# Patient Record
Sex: FEMALE | Race: WHITE | Employment: FULL TIME | URBAN - NONMETROPOLITAN AREA
[De-identification: names, ages, dates, MRNs, and addresses within clinical notes are randomized per-mention and may not be internally consistent; named-entity substitution may affect disease eponyms.]

---

## 2024-09-18 DIAGNOSIS — F90.2 ATTENTION DEFICIT HYPERACTIVITY DISORDER (ADHD), COMBINED TYPE: ICD-10-CM

## 2024-09-18 RX ORDER — DEXTROAMPHETAMINE SACCHARATE, AMPHETAMINE ASPARTATE, DEXTROAMPHETAMINE SULFATE AND AMPHETAMINE SULFATE 5; 5; 5; 5 MG/1; MG/1; MG/1; MG/1
20 TABLET ORAL 2 TIMES DAILY
Qty: 60 TABLET | Refills: 0 | Status: SHIPPED | OUTPATIENT
Start: 2024-09-18 | End: 2024-10-18 | Stop reason: SDUPTHER

## 2024-09-18 NOTE — TELEPHONE ENCOUNTER
Prescription sent to the pharmacy as below.  Please notify the patient.    Requested Prescriptions     Signed Prescriptions Disp Refills    amphetamine-dextroamphetamine (ADDERALL, 20MG,) 20 MG tablet 60 tablet 0     Sig: Take 1 tablet by mouth 2 times daily for 30 days. Max Daily Amount: 40 mg     Authorizing Provider: GEMA GOODWIN report reviewed and no contraindications or problems noted.          Electronically signed by Gema Goodwin MD on 9/18/2024 at 11:04 AM

## 2024-09-18 NOTE — TELEPHONE ENCOUNTER
This medication refill is regarding a MyChart request. Refill requested by patient.    Requested Prescriptions     Pending Prescriptions Disp Refills    amphetamine-dextroamphetamine (ADDERALL, 20MG,) 20 MG tablet 60 tablet 0     Sig: Take 1 tablet by mouth 2 times daily for 30 days. Max Daily Amount: 40 mg     Date of last visit: 8/16/2024   Date of next visit: 2/21/2025  Date of last refill: 8/16/24 #60/0  Pharmacy Name: [facility] [address]    Rx verified, ordered and set to EP.

## 2024-09-20 ENCOUNTER — HOSPITAL ENCOUNTER (OUTPATIENT)
Dept: WOMENS IMAGING | Age: 54
Discharge: HOME | End: 2024-09-20
Payer: COMMERCIAL

## 2024-09-20 DIAGNOSIS — Z12.31 ENCOUNTER FOR SCREENING MAMMOGRAM FOR MALIGNANT NEOPLASM OF BREAST: ICD-10-CM

## 2024-09-20 PROCEDURE — 77063 BREAST TOMOSYNTHESIS BI: CPT

## 2024-10-15 DIAGNOSIS — J45.30 ALLERGY-INDUCED ASTHMA, MILD PERSISTENT, UNCOMPLICATED: ICD-10-CM

## 2024-10-15 RX ORDER — FLUTICASONE PROPIONATE AND SALMETEROL 250; 50 UG/1; UG/1
POWDER RESPIRATORY (INHALATION)
Qty: 180 EACH | Refills: 3 | Status: SHIPPED | OUTPATIENT
Start: 2024-10-15

## 2024-10-15 NOTE — TELEPHONE ENCOUNTER
Request sent from [facility] for refill of Wixela inhaler bid.  Last seen 8/16/24, next appt 2/21/25.  Order pended.

## 2024-10-17 DIAGNOSIS — A60.00 GENITAL HERPES SIMPLEX, UNSPECIFIED SITE: ICD-10-CM

## 2024-10-17 RX ORDER — ACYCLOVIR 400 MG/1
400 TABLET ORAL 2 TIMES DAILY
Qty: 180 TABLET | Refills: 3 | Status: SHIPPED | OUTPATIENT
Start: 2024-10-17

## 2024-10-17 NOTE — TELEPHONE ENCOUNTER
This medication refill is regarding a electronic request. Refill requested by  Pharmacy .    Requested Prescriptions     Pending Prescriptions Disp Refills    acyclovir (ZOVIRAX) 400 MG tablet [Pharmacy Med Name: ACYCLOVIR 400 MG TABLET] 180 tablet 3     Sig: TAKE 1 TAB 3 TIMES A DAY FOR 10 DAYS THEN DECREASE TO 1 TAB TWICE A DAY THEREAFTER.     Date of last visit: 8/16/2024   Date of next visit: 2/21/2025  Date of last refill: 12/26/2023 #60/11  [facility] Name: [facility] [address]    Last Lipid Panel:    Lab Results   Component Value Date/Time    CHOL 218 02/22/2022 08:48 AM    TRIG 67 02/22/2022 08:48 AM    HDL 63 02/22/2022 08:48 AM     Last CMP:   Lab Results   Component Value Date     12/10/2023    K 3.9 12/10/2023     12/10/2023    CO2 22 (L) 12/10/2023    BUN 23 (H) 12/10/2023    CREATININE 0.9 12/10/2023    GLUCOSE 87 12/10/2023    CALCIUM 8.9 12/10/2023    BILITOT 0.3 12/10/2023    ALKPHOS 56 12/10/2023    AST 19 12/10/2023    ALT 25 12/10/2023    LABGLOM >60 12/10/2023       Last Thyroid:    Lab Results   Component Value Date    TSH 2.930 06/19/2019     Last Hemoglobin A1C:  No results found for: \"LABA1C\"    Rx verified, ordered and set to EP.

## 2024-10-18 DIAGNOSIS — F90.2 ATTENTION DEFICIT HYPERACTIVITY DISORDER (ADHD), COMBINED TYPE: ICD-10-CM

## 2024-10-18 RX ORDER — DEXTROAMPHETAMINE SACCHARATE, AMPHETAMINE ASPARTATE, DEXTROAMPHETAMINE SULFATE AND AMPHETAMINE SULFATE 5; 5; 5; 5 MG/1; MG/1; MG/1; MG/1
20 TABLET ORAL 2 TIMES DAILY
Qty: 60 TABLET | Refills: 0 | Status: SHIPPED | OUTPATIENT
Start: 2024-10-18 | End: 2024-11-17

## 2024-10-18 NOTE — TELEPHONE ENCOUNTER
Prescription sent to the pharmacy as below.  Please notify the patient.    Requested Prescriptions     Signed Prescriptions Disp Refills    amphetamine-dextroamphetamine (ADDERALL, 20MG,) 20 MG tablet 60 tablet 0     Sig: Take 1 tablet by mouth 2 times daily for 30 days. Max Daily Amount: 40 mg     Authorizing Provider: GEMA GOODWIN report reviewed and no contraindications or problems noted.          Electronically signed by Gema Goodwin MD on 10/18/2024 at 10:21 AM

## 2024-10-18 NOTE — TELEPHONE ENCOUNTER
This medication refill is regarding a electronic request. Refill requested by  patient .    Requested Prescriptions     Pending Prescriptions Disp Refills    amphetamine-dextroamphetamine (ADDERALL, 20MG,) 20 MG tablet 60 tablet 0     Sig: Take 1 tablet by mouth 2 times daily for 30 days. Max Daily Amount: 40 mg     Date of last visit: 8/16/2024   Date of next visit: 2/21/2025  Date of last refill: 09/18/2024 #60/0  [facility]: [facility] [address]    Last Lipid Panel:    Lab Results   Component Value Date/Time    CHOL 218 02/22/2022 08:48 AM    TRIG 67 02/22/2022 08:48 AM    HDL 63 02/22/2022 08:48 AM     Last CMP:   Lab Results   Component Value Date     12/10/2023    K 3.9 12/10/2023     12/10/2023    CO2 22 (L) 12/10/2023    BUN 23 (H) 12/10/2023    CREATININE 0.9 12/10/2023    GLUCOSE 87 12/10/2023    CALCIUM 8.9 12/10/2023    BILITOT 0.3 12/10/2023    ALKPHOS 56 12/10/2023    AST 19 12/10/2023    ALT 25 12/10/2023    LABGLOM >60 12/10/2023       Last Thyroid:    Lab Results   Component Value Date    TSH 2.930 06/19/2019     Last Hemoglobin A1C:  No results found for: \"LABA1C\"    Rx verified, ordered and set to EP.

## 2024-11-15 DIAGNOSIS — F90.2 ATTENTION DEFICIT HYPERACTIVITY DISORDER (ADHD), COMBINED TYPE: ICD-10-CM

## 2024-11-18 DIAGNOSIS — F90.2 ATTENTION DEFICIT HYPERACTIVITY DISORDER (ADHD), COMBINED TYPE: ICD-10-CM

## 2024-11-18 RX ORDER — DEXTROAMPHETAMINE SACCHARATE, AMPHETAMINE ASPARTATE, DEXTROAMPHETAMINE SULFATE AND AMPHETAMINE SULFATE 5; 5; 5; 5 MG/1; MG/1; MG/1; MG/1
20 TABLET ORAL 2 TIMES DAILY
Qty: 60 TABLET | Refills: 0 | Status: SHIPPED | OUTPATIENT
Start: 2024-11-18 | End: 2024-12-18

## 2024-11-18 RX ORDER — DEXTROAMPHETAMINE SACCHARATE, AMPHETAMINE ASPARTATE, DEXTROAMPHETAMINE SULFATE AND AMPHETAMINE SULFATE 5; 5; 5; 5 MG/1; MG/1; MG/1; MG/1
20 TABLET ORAL 2 TIMES DAILY
Qty: 60 TABLET | Refills: 0 | Status: CANCELLED | OUTPATIENT
Start: 2024-11-18 | End: 2024-12-18

## 2024-11-18 NOTE — TELEPHONE ENCOUNTER
Prescription sent to the pharmacy as below.  Please notify the patient.    Requested Prescriptions     Signed Prescriptions Disp Refills    amphetamine-dextroamphetamine (ADDERALL, 20MG,) 20 MG tablet 60 tablet 0     Sig: Take 1 tablet by mouth 2 times daily for 30 days. Max Daily Amount: 40 mg     Authorizing Provider: GEMA GOODWIN report reviewed and no contraindications or problems noted.          Electronically signed by Gema Goodwin MD on 11/18/2024 at 11:13 AM

## 2024-11-18 NOTE — TELEPHONE ENCOUNTER
This medication refill is regarding a MyChart request. Refill requested by patient.    Requested Prescriptions     Pending Prescriptions Disp Refills    amphetamine-dextroamphetamine (ADDERALL, 20MG,) 20 MG tablet 60 tablet 0     Sig: Take 1 tablet by mouth 2 times daily for 30 days. Max Daily Amount: 40 mg     Date of last visit: 8/16/2024   Date of next visit: 2/21/2025  Date of last refill: 10/18/24 #60/0  [facility]: [facility] [address]    Rx verified, ordered and set to EP.

## 2024-11-18 NOTE — TELEPHONE ENCOUNTER
This medication refill is regarding a MyChart request. Refill requested by  patient .    Requested Prescriptions     Pending Prescriptions Disp Refills    amphetamine-dextroamphetamine (ADDERALL, 20MG,) 20 MG tablet 60 tablet 0     Sig: Take 1 tablet by mouth 2 times daily for 30 days. Max Daily Amount: 40 mg       Date of last visit: 8/16/2024   Date of next visit: 2/21/2025  Date of last refill: 11/18/2024  60/0  Last Lipid Panel:    Lab Results   Component Value Date/Time    CHOL 218 02/22/2022 08:48 AM    TRIG 67 02/22/2022 08:48 AM    HDL 63 02/22/2022 08:48 AM     Last CMP:   Lab Results   Component Value Date     12/10/2023    K 3.9 12/10/2023     12/10/2023    CO2 22 (L) 12/10/2023    BUN 23 (H) 12/10/2023    CREATININE 0.9 12/10/2023    GLUCOSE 87 12/10/2023    CALCIUM 8.9 12/10/2023    BILITOT 0.3 12/10/2023    ALKPHOS 56 12/10/2023    AST 19 12/10/2023    ALT 25 12/10/2023    LABGLOM >60 12/10/2023       Last Thyroid:    Lab Results   Component Value Date    TSH 2.930 06/19/2019     Denied. Script already sent to pharmacy today.

## 2024-11-25 ENCOUNTER — LAB (OUTPATIENT)
Dept: LAB | Age: 54
End: 2024-11-25

## 2024-11-25 DIAGNOSIS — Z00.00 ANNUAL PHYSICAL EXAM: ICD-10-CM

## 2024-11-25 LAB
ALBUMIN SERPL BCG-MCNC: 4.2 G/DL (ref 3.5–5.1)
ALP SERPL-CCNC: 68 U/L (ref 38–126)
ALT SERPL W/O P-5'-P-CCNC: 16 U/L (ref 11–66)
ANION GAP SERPL CALC-SCNC: 11 MEQ/L (ref 8–16)
AST SERPL-CCNC: 19 U/L (ref 5–40)
BILIRUB SERPL-MCNC: 0.4 MG/DL (ref 0.3–1.2)
BUN SERPL-MCNC: 16 MG/DL (ref 7–22)
CALCIUM SERPL-MCNC: 9.4 MG/DL (ref 8.5–10.5)
CHLORIDE SERPL-SCNC: 106 MEQ/L (ref 98–111)
CHOLEST SERPL-MCNC: 176 MG/DL (ref 100–199)
CO2 SERPL-SCNC: 24 MEQ/L (ref 23–33)
CREAT SERPL-MCNC: 0.9 MG/DL (ref 0.4–1.2)
GFR SERPL CREATININE-BSD FRML MDRD: 76 ML/MIN/1.73M2
GLUCOSE SERPL-MCNC: 106 MG/DL (ref 70–108)
HDLC SERPL-MCNC: 62 MG/DL
LDLC SERPL CALC-MCNC: 104 MG/DL
POTASSIUM SERPL-SCNC: 4.2 MEQ/L (ref 3.5–5.2)
PROT SERPL-MCNC: 6.4 G/DL (ref 6.1–8)
SODIUM SERPL-SCNC: 141 MEQ/L (ref 135–145)
TRIGL SERPL-MCNC: 48 MG/DL (ref 0–199)

## 2024-12-18 DIAGNOSIS — F90.2 ATTENTION DEFICIT HYPERACTIVITY DISORDER (ADHD), COMBINED TYPE: ICD-10-CM

## 2024-12-18 RX ORDER — DEXTROAMPHETAMINE SACCHARATE, AMPHETAMINE ASPARTATE, DEXTROAMPHETAMINE SULFATE AND AMPHETAMINE SULFATE 5; 5; 5; 5 MG/1; MG/1; MG/1; MG/1
20 TABLET ORAL 2 TIMES DAILY
Qty: 60 TABLET | Refills: 0 | Status: SHIPPED | OUTPATIENT
Start: 2024-12-18 | End: 2025-01-17

## 2025-01-17 DIAGNOSIS — F90.2 ATTENTION DEFICIT HYPERACTIVITY DISORDER (ADHD), COMBINED TYPE: ICD-10-CM

## 2025-01-20 RX ORDER — DEXTROAMPHETAMINE SACCHARATE, AMPHETAMINE ASPARTATE, DEXTROAMPHETAMINE SULFATE AND AMPHETAMINE SULFATE 5; 5; 5; 5 MG/1; MG/1; MG/1; MG/1
20 TABLET ORAL 2 TIMES DAILY
Qty: 60 TABLET | Refills: 0 | Status: SHIPPED | OUTPATIENT
Start: 2025-01-20 | End: 2025-02-21 | Stop reason: SDUPTHER

## 2025-01-20 NOTE — TELEPHONE ENCOUNTER
This medication refill is regarding a MyChart request. Refill requested by patient.    Requested Prescriptions     Pending Prescriptions Disp Refills    amphetamine-dextroamphetamine (ADDERALL, 20MG,) 20 MG tablet 60 tablet 0     Sig: Take 1 tablet by mouth 2 times daily for 30 days. Max Daily Amount: 40 mg     Date of last visit: 8/16/2024   Date of next visit: 2/21/2025  Date of last refill: 12/18/24 #60/0  [facility]: [facility] [address]    Rx verified, ordered and set to EP.

## 2025-01-20 NOTE — TELEPHONE ENCOUNTER
Prescription sent to the pharmacy as below.  Please notify the patient.    Requested Prescriptions     Signed Prescriptions Disp Refills    amphetamine-dextroamphetamine (ADDERALL, 20MG,) 20 MG tablet 60 tablet 0     Sig: Take 1 tablet by mouth 2 times daily for 30 days. Max Daily Amount: 40 mg     Authorizing Provider: GEMA GOODWIN report reviewed and no contraindications or problems noted.          Electronically signed by Gema Goodwin MD on 1/20/2025 at 9:50 AM

## 2025-02-20 DIAGNOSIS — F90.2 ATTENTION DEFICIT HYPERACTIVITY DISORDER (ADHD), COMBINED TYPE: ICD-10-CM

## 2025-02-20 RX ORDER — DEXTROAMPHETAMINE SACCHARATE, AMPHETAMINE ASPARTATE, DEXTROAMPHETAMINE SULFATE AND AMPHETAMINE SULFATE 5; 5; 5; 5 MG/1; MG/1; MG/1; MG/1
20 TABLET ORAL 2 TIMES DAILY
Qty: 60 TABLET | Refills: 0 | Status: CANCELLED | OUTPATIENT
Start: 2025-02-20 | End: 2025-03-22

## 2025-02-21 ENCOUNTER — OFFICE VISIT (OUTPATIENT)
Dept: FAMILY MEDICINE CLINIC | Age: 55
End: 2025-02-21

## 2025-02-21 VITALS
SYSTOLIC BLOOD PRESSURE: 126 MMHG | WEIGHT: 172.6 LBS | RESPIRATION RATE: 16 BRPM | HEART RATE: 80 BPM | BODY MASS INDEX: 27.03 KG/M2 | DIASTOLIC BLOOD PRESSURE: 78 MMHG | TEMPERATURE: 97.7 F

## 2025-02-21 DIAGNOSIS — G43.809 OTHER MIGRAINE WITHOUT STATUS MIGRAINOSUS, NOT INTRACTABLE: ICD-10-CM

## 2025-02-21 DIAGNOSIS — F90.2 ATTENTION DEFICIT HYPERACTIVITY DISORDER (ADHD), COMBINED TYPE: Primary | ICD-10-CM

## 2025-02-21 RX ORDER — DEXTROAMPHETAMINE SACCHARATE, AMPHETAMINE ASPARTATE, DEXTROAMPHETAMINE SULFATE AND AMPHETAMINE SULFATE 5; 5; 5; 5 MG/1; MG/1; MG/1; MG/1
20 TABLET ORAL 2 TIMES DAILY
Qty: 60 TABLET | Refills: 0 | Status: SHIPPED | OUTPATIENT
Start: 2025-02-21 | End: 2025-03-23

## 2025-02-21 RX ORDER — SUMATRIPTAN SUCCINATE 25 MG/1
TABLET ORAL
Qty: 9 TABLET | Refills: 1 | Status: SHIPPED | OUTPATIENT
Start: 2025-02-21

## 2025-02-21 SDOH — ECONOMIC STABILITY: FOOD INSECURITY: WITHIN THE PAST 12 MONTHS, YOU WORRIED THAT YOUR FOOD WOULD RUN OUT BEFORE YOU GOT MONEY TO BUY MORE.: NEVER TRUE

## 2025-02-21 SDOH — ECONOMIC STABILITY: FOOD INSECURITY: WITHIN THE PAST 12 MONTHS, THE FOOD YOU BOUGHT JUST DIDN'T LAST AND YOU DIDN'T HAVE MONEY TO GET MORE.: NEVER TRUE

## 2025-02-21 ASSESSMENT — PATIENT HEALTH QUESTIONNAIRE - PHQ9
2. FEELING DOWN, DEPRESSED OR HOPELESS: NOT AT ALL
SUM OF ALL RESPONSES TO PHQ QUESTIONS 1-9: 0
SUM OF ALL RESPONSES TO PHQ9 QUESTIONS 1 & 2: 0
SUM OF ALL RESPONSES TO PHQ QUESTIONS 1-9: 0
1. LITTLE INTEREST OR PLEASURE IN DOING THINGS: NOT AT ALL

## 2025-02-21 ASSESSMENT — ENCOUNTER SYMPTOMS
NAUSEA: 0
SHORTNESS OF BREATH: 0

## 2025-02-21 NOTE — PROGRESS NOTES
Health Maintenance Due   Topic Date Due    Diabetes screen  Never done    Pneumococcal 50+ years Vaccine (2 of 2 - PPSV23) 10/28/2015    Shingles vaccine (1 of 2) Never done    Cervical cancer screen  Last done 8/20/19, will schedule with PCP or GYN.     COVID-19 Vaccine (4 - 2024-25 season) 09/01/2024

## 2025-02-21 NOTE — PROGRESS NOTES
2025      Chrissie GAMEZ (:  1970) is a 54 y.o. female,Established patient, here for evaluation of the following chief complaint(s):  6 Month Follow-Up (Here today for 6 month f/up for ADHD, anxiety and asthma. )        ASSESSMENT/PLAN     1. Attention deficit hyperactivity disorder (ADHD), combined type  -     amphetamine-dextroamphetamine (ADDERALL, 20MG,) 20 MG tablet; Take 1 tablet by mouth 2 times daily for 30 days. Max Daily Amount: 40 mg, Disp-60 tablet, R-0Normal  2. Other migraine without status migrainosus, not intractable  -     SUMAtriptan (IMITREX) 25 MG tablet; Take 1 po at the onset of migraine.  May repeat dose x 1 in 2 hours if needed., Disp-9 tablet, R-1Normal       Continue Adderall 20 mg twice daily for ADHD.  This condition is stable and well-controlled.  Patient is encouraged to take medication on a consistent basis.  [facility] report reviewed no contraindications or problems noted.    Med refill given today for Imitrex 25 mg to be taken as needed for migraine.  Patient reports that her migraines are stable and she only needs to use the medication a few times per year.  It is effective when she uses it.  This is a chronic and stable condition that is addressed today.      Return in about 6 months (around 2025) for Annual Physical.    SUBJECTIVE     Chrissie GAMEZ is a 54 y.o.female      Here for follow up of chronic health problems including:    Patient Active Problem List   Diagnosis    Allergic rhinitis    Asthma    Anxiety    Attention deficit hyperactivity disorder (ADHD), combined type    Allergy-induced asthma, mild persistent, uncomplicated         History of Present Illness  The patient presents for a regular follow-up on her attention deficit medication and migraine    She reports that her Adderall is generally effective for treating her ADHD.  She adheres to a daily medication schedule, which she finds beneficial for her concentration, particularly in her

## 2025-03-21 DIAGNOSIS — F90.2 ATTENTION DEFICIT HYPERACTIVITY DISORDER (ADHD), COMBINED TYPE: ICD-10-CM

## 2025-03-21 RX ORDER — DEXTROAMPHETAMINE SACCHARATE, AMPHETAMINE ASPARTATE, DEXTROAMPHETAMINE SULFATE AND AMPHETAMINE SULFATE 5; 5; 5; 5 MG/1; MG/1; MG/1; MG/1
20 TABLET ORAL 2 TIMES DAILY
Qty: 60 TABLET | Refills: 0 | Status: SHIPPED | OUTPATIENT
Start: 2025-03-21 | End: 2025-03-24 | Stop reason: SDUPTHER

## 2025-03-21 NOTE — TELEPHONE ENCOUNTER
This medication refill is regarding a MyChart request. Refill requested by patient.    Requested Prescriptions     Pending Prescriptions Disp Refills    amphetamine-dextroamphetamine (ADDERALL, 20MG,) 20 MG tablet 60 tablet 0     Sig: Take 1 tablet by mouth 2 times daily for 30 days. Max Daily Amount: 40 mg     Date of last visit: 2/21/2025   Date of next visit: 8/19/2025  Date of last refill: 2/21/25 #60/0  [facility] Name: [facility] [address]    Rx verified, ordered and set to EP.

## 2025-03-21 NOTE — TELEPHONE ENCOUNTER
Prescription sent to the pharmacy as below.  Please notify the patient.    Requested Prescriptions     Signed Prescriptions Disp Refills    amphetamine-dextroamphetamine (ADDERALL, 20MG,) 20 MG tablet 60 tablet 0     Sig: Take 1 tablet by mouth 2 times daily for 30 days. Max Daily Amount: 40 mg     Authorizing Provider: GEMA GOODWIN report reviewed and no contraindications or problems noted.          Electronically signed by Gema Goodwin MD on 3/21/2025 at 1:12 PM

## 2025-03-24 DIAGNOSIS — F90.2 ATTENTION DEFICIT HYPERACTIVITY DISORDER (ADHD), COMBINED TYPE: ICD-10-CM

## 2025-03-24 RX ORDER — DEXTROAMPHETAMINE SACCHARATE, AMPHETAMINE ASPARTATE, DEXTROAMPHETAMINE SULFATE AND AMPHETAMINE SULFATE 5; 5; 5; 5 MG/1; MG/1; MG/1; MG/1
20 TABLET ORAL 2 TIMES DAILY
Qty: 60 TABLET | Refills: 0 | Status: CANCELLED | OUTPATIENT
Start: 2025-03-24 | End: 2025-04-23

## 2025-03-24 RX ORDER — DEXTROAMPHETAMINE SACCHARATE, AMPHETAMINE ASPARTATE, DEXTROAMPHETAMINE SULFATE AND AMPHETAMINE SULFATE 5; 5; 5; 5 MG/1; MG/1; MG/1; MG/1
20 TABLET ORAL 2 TIMES DAILY
Qty: 60 TABLET | Refills: 0 | OUTPATIENT
Start: 2025-03-24 | End: 2025-04-23

## 2025-03-24 RX ORDER — DEXTROAMPHETAMINE SACCHARATE, AMPHETAMINE ASPARTATE, DEXTROAMPHETAMINE SULFATE AND AMPHETAMINE SULFATE 5; 5; 5; 5 MG/1; MG/1; MG/1; MG/1
20 TABLET ORAL 2 TIMES DAILY
Qty: 60 TABLET | Refills: 0 | Status: SHIPPED | OUTPATIENT
Start: 2025-03-24 | End: 2025-04-23

## 2025-03-24 NOTE — TELEPHONE ENCOUNTER
Prescription sent to the pharmacy as below.  Please notify the patient.    Requested Prescriptions     Signed Prescriptions Disp Refills    amphetamine-dextroamphetamine (ADDERALL, 20MG,) 20 MG tablet 60 tablet 0     Sig: Take 1 tablet by mouth 2 times daily for 30 days. Max Daily Amount: 40 mg     Authorizing Provider: GEMA GOODWIN report reviewed and no contraindications or problems noted.          Electronically signed by Gema Goodwin MD on 3/24/2025 at 4:58 PM

## 2025-03-24 NOTE — TELEPHONE ENCOUNTER
Message sent to pt letting her know her adderall was sent to [facility] on [address] on 3/21/25. Pt asked to check with the pharmacy regarding a refill at this time.

## 2025-03-25 DIAGNOSIS — F90.2 ATTENTION DEFICIT HYPERACTIVITY DISORDER (ADHD), COMBINED TYPE: ICD-10-CM

## 2025-03-25 RX ORDER — DEXTROAMPHETAMINE SACCHARATE, AMPHETAMINE ASPARTATE, DEXTROAMPHETAMINE SULFATE AND AMPHETAMINE SULFATE 5; 5; 5; 5 MG/1; MG/1; MG/1; MG/1
20 TABLET ORAL 2 TIMES DAILY
Qty: 60 TABLET | Refills: 0 | OUTPATIENT
Start: 2025-03-25 | End: 2025-04-24

## 2025-04-21 DIAGNOSIS — F90.2 ATTENTION DEFICIT HYPERACTIVITY DISORDER (ADHD), COMBINED TYPE: ICD-10-CM

## 2025-04-21 DIAGNOSIS — G43.809 OTHER MIGRAINE WITHOUT STATUS MIGRAINOSUS, NOT INTRACTABLE: ICD-10-CM

## 2025-04-21 RX ORDER — SUMATRIPTAN SUCCINATE 25 MG/1
TABLET ORAL
Qty: 9 TABLET | Refills: 1 | Status: SHIPPED | OUTPATIENT
Start: 2025-04-21

## 2025-04-21 NOTE — TELEPHONE ENCOUNTER
This medication refill is regarding a electronic request. Refill requested by  [address] .    Requested Prescriptions     Pending Prescriptions Disp Refills    SUMAtriptan (IMITREX) 25 MG tablet [Pharmacy Med Name: SUMATRIPTAN SUCC 25 MG TABLET] 9 tablet 1     Sig: TAKE 1 TABLET AT THE ONSET OF MIGRAINE. MAY REPEAT DOSE IN 2 HOURS IF NEEDED.     Date of last visit: 2/21/2025   Date of next visit: 8/19/2025  Date of last refill: 2/21/25 #9/1    Rx verified, ordered and set to EP.

## 2025-04-22 RX ORDER — DEXTROAMPHETAMINE SACCHARATE, AMPHETAMINE ASPARTATE, DEXTROAMPHETAMINE SULFATE AND AMPHETAMINE SULFATE 5; 5; 5; 5 MG/1; MG/1; MG/1; MG/1
20 TABLET ORAL 2 TIMES DAILY
Qty: 60 TABLET | Refills: 0 | Status: SHIPPED | OUTPATIENT
Start: 2025-04-22 | End: 2025-05-22

## 2025-04-22 NOTE — TELEPHONE ENCOUNTER
This medication refill is regarding a electronic request. Refill requested by patient.    Requested Prescriptions     Pending Prescriptions Disp Refills    amphetamine-dextroamphetamine (ADDERALL, 20MG,) 20 MG tablet 60 tablet 0     Sig: Take 1 tablet by mouth 2 times daily for 30 days. Max Daily Amount: 40 mg       Date of last visit: 2/21/2025   Date of next visit: 8/19/2025  Date of last refill: 3-24-25     Rx verified, ordered and set to EP.

## 2025-04-22 NOTE — TELEPHONE ENCOUNTER
Prescription sent to the pharmacy as below.  Please notify the patient.    Requested Prescriptions     Signed Prescriptions Disp Refills    amphetamine-dextroamphetamine (ADDERALL, 20MG,) 20 MG tablet 60 tablet 0     Sig: Take 1 tablet by mouth 2 times daily for 30 days. Max Daily Amount: 40 mg     Authorizing Provider: GEMA GOODWIN report reviewed and no contraindications or problems noted.          Electronically signed by Gema Goodwin MD on 4/22/2025 at 9:43 AM

## 2025-05-21 DIAGNOSIS — F90.2 ATTENTION DEFICIT HYPERACTIVITY DISORDER (ADHD), COMBINED TYPE: ICD-10-CM

## 2025-05-21 RX ORDER — DEXTROAMPHETAMINE SACCHARATE, AMPHETAMINE ASPARTATE, DEXTROAMPHETAMINE SULFATE AND AMPHETAMINE SULFATE 5; 5; 5; 5 MG/1; MG/1; MG/1; MG/1
20 TABLET ORAL 2 TIMES DAILY
Qty: 60 TABLET | Refills: 0 | Status: SHIPPED | OUTPATIENT
Start: 2025-05-21 | End: 2025-06-20 | Stop reason: SDUPTHER

## 2025-05-21 NOTE — TELEPHONE ENCOUNTER
This medication refill is regarding a MyChart request. Refill requested by patient.    Requested Prescriptions     Pending Prescriptions Disp Refills    amphetamine-dextroamphetamine (ADDERALL, 20MG,) 20 MG tablet 60 tablet 0     Sig: Take 1 tablet by mouth 2 times daily for 30 days. Max Daily Amount: 40 mg     Date of last visit: 2/21/2025   Date of next visit: 8/19/2025  Date of last refill: 4/22/2025 #60/0  [facility]: [facility] [address]    Rx verified, ordered and set to EP.

## 2025-05-21 NOTE — TELEPHONE ENCOUNTER
Prescription sent to the pharmacy as below.  Please notify the patient.    Requested Prescriptions     Signed Prescriptions Disp Refills    amphetamine-dextroamphetamine (ADDERALL, 20MG,) 20 MG tablet 60 tablet 0     Sig: Take 1 tablet by mouth 2 times daily for 30 days. Max Daily Amount: 40 mg     Authorizing Provider: GEMA GOODWIN report reviewed and no contraindications or problems noted.          Electronically signed by Gema Goodwin MD on 5/21/2025 at 11:10 AM

## 2025-06-20 DIAGNOSIS — J45.30 ALLERGY-INDUCED ASTHMA, MILD PERSISTENT, UNCOMPLICATED: ICD-10-CM

## 2025-06-20 DIAGNOSIS — F90.2 ATTENTION DEFICIT HYPERACTIVITY DISORDER (ADHD), COMBINED TYPE: ICD-10-CM

## 2025-06-20 RX ORDER — FLUTICASONE PROPIONATE AND SALMETEROL 250; 50 UG/1; UG/1
POWDER RESPIRATORY (INHALATION)
Qty: 180 EACH | Refills: 3 | Status: SHIPPED | OUTPATIENT
Start: 2025-06-20

## 2025-06-20 RX ORDER — DEXTROAMPHETAMINE SACCHARATE, AMPHETAMINE ASPARTATE, DEXTROAMPHETAMINE SULFATE AND AMPHETAMINE SULFATE 5; 5; 5; 5 MG/1; MG/1; MG/1; MG/1
20 TABLET ORAL 2 TIMES DAILY
Qty: 60 TABLET | Refills: 0 | Status: SHIPPED | OUTPATIENT
Start: 2025-06-20 | End: 2025-07-18 | Stop reason: SDUPTHER

## 2025-06-20 NOTE — TELEPHONE ENCOUNTER
This medication refill is regarding a MyChart request. Refill requested by patient.    Requested Prescriptions     Pending Prescriptions Disp Refills    fluticasone-salmeterol (WIXELA INHUB) 250-50 MCG/ACT AEPB diskus inhaler 180 each 3     Sig: USE 1 INHALATION ORALLY    TWICE DAILY     Date of last visit: 2/21/2025   Date of next visit: 8/19/2025  Date of last refill: 10/15/24 #180/3  [facility] Name: [facility] [address]    Rx verified, ordered and set to EP.

## 2025-07-18 DIAGNOSIS — A60.00 GENITAL HERPES SIMPLEX, UNSPECIFIED SITE: ICD-10-CM

## 2025-07-18 DIAGNOSIS — F90.2 ATTENTION DEFICIT HYPERACTIVITY DISORDER (ADHD), COMBINED TYPE: ICD-10-CM

## 2025-07-18 RX ORDER — ACYCLOVIR 400 MG/1
400 TABLET ORAL 2 TIMES DAILY
Qty: 180 TABLET | Refills: 3 | Status: SHIPPED | OUTPATIENT
Start: 2025-07-18

## 2025-07-18 RX ORDER — DEXTROAMPHETAMINE SACCHARATE, AMPHETAMINE ASPARTATE, DEXTROAMPHETAMINE SULFATE AND AMPHETAMINE SULFATE 5; 5; 5; 5 MG/1; MG/1; MG/1; MG/1
20 TABLET ORAL 2 TIMES DAILY
Qty: 60 TABLET | Refills: 0 | Status: SHIPPED | OUTPATIENT
Start: 2025-07-18 | End: 2025-08-17

## 2025-07-18 NOTE — TELEPHONE ENCOUNTER
This medication refill is regarding a MyChart request. Refill requested by patient.    Requested Prescriptions     Pending Prescriptions Disp Refills    acyclovir (ZOVIRAX) 400 MG tablet 180 tablet 3     Sig: Take 1 tablet by mouth 2 times daily    amphetamine-dextroamphetamine (ADDERALL, 20MG,) 20 MG tablet 60 tablet 0     Sig: Take 1 tablet by mouth 2 times daily for 30 days. Max Daily Amount: 40 mg     Date of last visit: 2/21/2025   Date of next visit: 8/19/2025  Date of last refill: 10/17/2024   Pharmacy Name: [facility]/pharmacy #[id number] - [facility] [address] [facility]     Last Lipid Panel:    Lab Results   Component Value Date/Time    CHOL 176 11/25/2024 06:09 AM    TRIG 48 11/25/2024 06:09 AM    HDL 62 11/25/2024 06:09 AM     Last CMP:   Lab Results   Component Value Date     11/25/2024    K 4.2 11/25/2024     11/25/2024    CO2 24 11/25/2024    BUN 16 11/25/2024    CREATININE 0.9 11/25/2024    GLUCOSE 106 11/25/2024    CALCIUM 9.4 11/25/2024    BILITOT 0.4 11/25/2024    ALKPHOS 68 11/25/2024    AST 19 11/25/2024    ALT 16 11/25/2024    LABGLOM 76 11/25/2024       Last Thyroid:    Lab Results   Component Value Date    TSH 2.930 06/19/2019     Last Hemoglobin A1C:  No results found for: \"LABA1C\"    Rx verified, ordered and set to EP.

## 2025-07-18 NOTE — TELEPHONE ENCOUNTER
OK for refill. WS    Controlled Substance Monitoring:    Acute and Chronic Pain Monitoring:   RX Monitoring Periodic Controlled Substance Monitoring   7/18/2025  12:23 PM No signs of potential drug abuse or diversion identified.;Obtaining appropriate analgesic effect of treatment.

## 2025-08-19 ENCOUNTER — OFFICE VISIT (OUTPATIENT)
Dept: FAMILY MEDICINE CLINIC | Age: 55
End: 2025-08-19
Payer: COMMERCIAL

## 2025-08-19 VITALS
SYSTOLIC BLOOD PRESSURE: 120 MMHG | WEIGHT: 178.6 LBS | BODY MASS INDEX: 28.03 KG/M2 | DIASTOLIC BLOOD PRESSURE: 86 MMHG | TEMPERATURE: 98.1 F | HEART RATE: 84 BPM | RESPIRATION RATE: 16 BRPM | HEIGHT: 67 IN

## 2025-08-19 DIAGNOSIS — Z12.31 ENCOUNTER FOR SCREENING MAMMOGRAM FOR MALIGNANT NEOPLASM OF BREAST: ICD-10-CM

## 2025-08-19 DIAGNOSIS — F90.2 ATTENTION DEFICIT HYPERACTIVITY DISORDER (ADHD), COMBINED TYPE: ICD-10-CM

## 2025-08-19 DIAGNOSIS — J45.30 ALLERGY-INDUCED ASTHMA, MILD PERSISTENT, UNCOMPLICATED: ICD-10-CM

## 2025-08-19 DIAGNOSIS — Z00.00 ANNUAL PHYSICAL EXAM: Primary | ICD-10-CM

## 2025-08-19 PROCEDURE — 99396 PREV VISIT EST AGE 40-64: CPT | Performed by: FAMILY MEDICINE

## 2025-08-19 RX ORDER — DEXTROAMPHETAMINE SACCHARATE, AMPHETAMINE ASPARTATE, DEXTROAMPHETAMINE SULFATE AND AMPHETAMINE SULFATE 5; 5; 5; 5 MG/1; MG/1; MG/1; MG/1
20 TABLET ORAL 2 TIMES DAILY
Qty: 60 TABLET | Refills: 0 | Status: SHIPPED | OUTPATIENT
Start: 2025-08-19 | End: 2025-09-18

## 2025-08-19 RX ORDER — FLUTICASONE PROPIONATE AND SALMETEROL 250; 50 UG/1; UG/1
POWDER RESPIRATORY (INHALATION)
Qty: 180 EACH | Refills: 3 | Status: CANCELLED | OUTPATIENT
Start: 2025-08-19

## 2025-08-19 RX ORDER — ALBUTEROL SULFATE 90 UG/1
INHALANT RESPIRATORY (INHALATION)
Qty: 1 EACH | Refills: 5 | Status: SHIPPED | OUTPATIENT
Start: 2025-08-19

## 2025-08-19 ASSESSMENT — ENCOUNTER SYMPTOMS
VOMITING: 0
DIARRHEA: 0
EYES NEGATIVE: 1
SHORTNESS OF BREATH: 0
BLOOD IN STOOL: 0
NAUSEA: 0
ABDOMINAL PAIN: 0